# Patient Record
Sex: MALE | Race: OTHER | HISPANIC OR LATINO | URBAN - METROPOLITAN AREA
[De-identification: names, ages, dates, MRNs, and addresses within clinical notes are randomized per-mention and may not be internally consistent; named-entity substitution may affect disease eponyms.]

---
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
## 2023-10-13 NOTE — ED PROVIDER NOTE - CROS ED MUSC ALL NEG
Daily Note     Today's date: 10/13/2023  Patient name: Garth Quintero. : 1964  MRN: [id number]  Referring provider: Nina Cox*  Dx:   Encounter Diagnosis     ICD-10-CM    1. Aftercare following surgery of the musculoskeletal system  Z47.89       2. Traumatic incomplete tear of left rotator cuff, subsequent encounter  S46.012D           Start Time: 825  Stop Time: 911  Total time in clinic (min): 46 minutes    Subjective:  pt noted having some fatigue upon arrival. Pt noted that has been doing a lot over the week and noted that he did have some increase pain as of last night. Pt reported that he feels like the pain does present more muscular. Objective: See treatment diary below      Assessment:  Continued with treatment session with focus on L shoulder Overall patient was able to complete all exercises w/o exacerbation of p!. Did note some increase in muscle fatigue  Tolerated treatment well. Patient exhibited good technique with therapeutic exercises and would benefit from continued PT    Education reviewed with patient with verbal agreement and understanding.   - HEP compliance. - DOMS 24 to 48 hours of muscle soreness. - Modalities such as CP 10 - 20 min on and 60 min off  Prior to reapplication if needed          Plan: Continue per plan of care. Precautions: status post left shoulder arthroscopy, extensive debridement, and biceps tenodesis on 2023  Access Code: [email or web address] - updated on 10/10/23        POC expires Auth Status Unit limit Start date  Expiration date PT/OT + Visit Limit?  [address] [place] [address]       Re-eval by 10/25 N/A N/A N//A N/A WC BOMN               Visit/Unit Tracking  AUTH Status:  Date 9/5 9/8 9/13 9/15 9/18 9/22 9/25 10/2 10/6 10/10 10/13    BOMN Used 13 14 15 16 17 18 19 20 21 22 23     Remaining                    Manuals 10/6 10/10 10/13  9/13 9/15 9/18 9/22 9/25 10/2   L Shoulder PROM SC SC SC  BE BE BE SC BE JF Reassessment         BE    Neuro Re-Ed             Bent over Y,T,I   L only 3# 10x  3# 2x10 3# 2x 10     3# x10 ea dir  3# 10x ea. 3# 10x ea.    Bent over row 5# 2x 10  5# 2x 10  5# 2x 10   3# 3x10  5# 2x10 5# 3x10  5# 2x 10   5# 2x 10    TB rows Blue 3x 10  BTB 3x 10  BTB 3x 10   GTB 5" 3x10 GTB 5" 3x10 Blue 5" 2x10 Blue 3x 10  Blue 3x 10 Blue 3x 10   TB pulldowns  Blue 3x 10  BTB 3x 10  BTB 3x 10   GTB 5" 3x10 GTB 5" 3x10 Blue 5" 2x10 Blue 3x 10  Blue 3x 10 Blue 3x 10   Wall walks with TB  RTB 7x  NV          TB PNF D2 flex  GTB 2x 10  NV                       Ther Ex             Wall slides flex Flex and scapt   RTB x15 ea Flex and scaption 2x 10     Flex and scapt   RTB x10 ea  Flex and scapt   RTB x10 ea Flex and scapt   RTB x10 ea Flex and scapt   RTB x15 ea Flex and scapt   RTB x15 ea   Standing 3 way AROM  4# 2x 10  NV   3# x15 ea dir  3# 2x10 ea dir 3# 3x10 3# 3x 10  4# 2x10 4# 2x10   UBE fwd and retro for ROM  L5 4'4/' L5 4'/4'  L5 4'/4'  L5 4'/4' L5 4'/4' L5 4'/4' L5 4'/4'   Sidelying shoulder ER AROM     2# 2x10  3# 2x10       TB ER BTB 2x 10  BTB 2x 10  BTB 2x 10   GTB 3x10 GTB 3x10 Blue 2x10 Blue 2x 10   Blue 2x 10    TB IR BTB  2x 10  BTB 2x 10  BTB 2x 10   Blue 3x10 Blue 3x10 Blue 3x10 Blue 3x 10   Blue 3x 10    Bicep curl with ecc control      Blue 2x10 3" lower Blue 3x10 3" lower       90/90 shoulder overhead press with DB  NV           Pt education         HEP review    Ther Activity                                       Gait Training                                       Modalities                                                           1 on 1 time billed only for 24 minutes on 10/13/23 negative...

## 2024-07-26 NOTE — ED ADULT NURSE NOTE - NS ED NOTE ABUSE SUSPICION NEGLECT YN
DATE/TIME OF CALL RECEIVED FROM LAB:  07/26/24 at 11:48 AM     LAB TEST & LAB VALUE:Preliminary  Critical WBC 1.23 ANC 0.1 Plts 8  Other Hgb 8.1  Sending T&S to [facility] for final run.     PROVIDER NOTIFIED?: Yes    PROVIDER NAME: Gwen Pitts APRN    TIME LAB VALUE REPORTED TO PROVIDER:   1153    MECHANISM OF PROVIDER NOTIFICATION: Page and secure chat    PROVIDER RESPONSE:   1200 Gwen is aware and will discuss plan for pt during provider visit this afternoon with Gwen.       No